# Patient Record
Sex: FEMALE | Race: WHITE | URBAN - METROPOLITAN AREA
[De-identification: names, ages, dates, MRNs, and addresses within clinical notes are randomized per-mention and may not be internally consistent; named-entity substitution may affect disease eponyms.]

---

## 2017-02-07 ENCOUNTER — TELEPHONE (OUTPATIENT)
Dept: NURSING | Facility: CLINIC | Age: 37
End: 2017-02-07

## 2017-02-08 ENCOUNTER — OFFICE VISIT (OUTPATIENT)
Dept: FAMILY MEDICINE | Facility: CLINIC | Age: 37
End: 2017-02-08
Payer: COMMERCIAL

## 2017-02-08 ENCOUNTER — TELEPHONE (OUTPATIENT)
Dept: NURSING | Facility: CLINIC | Age: 37
End: 2017-02-08

## 2017-02-08 VITALS
BODY MASS INDEX: 26.12 KG/M2 | SYSTOLIC BLOOD PRESSURE: 102 MMHG | WEIGHT: 153 LBS | RESPIRATION RATE: 14 BRPM | HEIGHT: 64 IN | OXYGEN SATURATION: 99 % | HEART RATE: 74 BPM | TEMPERATURE: 97.9 F | DIASTOLIC BLOOD PRESSURE: 66 MMHG

## 2017-02-08 PROCEDURE — 99202 OFFICE O/P NEW SF 15 MIN: CPT | Performed by: PHYSICIAN ASSISTANT

## 2017-02-08 NOTE — TELEPHONE ENCOUNTER
"Call Type: Triage Call    Presenting Problem: \"Stuck by dirty needle\" used to give hydration to  her cat. Was very discolored and now less discolored. Dull soreness  and numbness. Swelling and some bleeding today.  Triage Note:  Guideline Title: Puncture Wound  Recommended Disposition: See Provider within 4 hours  Original Inclination: Did not know what to do  Override Disposition:  Intended Action: Follow advice given  Physician Contacted: No  Any new OR worsening signs and symptoms of soft tissue infection ?  YES  Human bite OR animal bite ? NO  New or worsening signs and symptoms that may indicate shock ? NO  Bleeding not controlled with 10 minutes of direct pressure or controlled with  pressure but starts again when pressure released ? NO  Grossly dirty wound or unable to clean ? NO  New onset numbness/tingling, weakness/paralysis, or inability to purposely move at  or below the site of injury ? NO  Blood spurting from wound despite firm pressure or large amount of blood loss  (such as soaked hand towel) ? NO  Obvious new deformity (bone is visibly out of place or misshapen) ? NO  Unbearable pain since injury ? NO  Severe traumatic injury likely to cause damage to underlying structures/organs of  torso, head, neck or face or a complete/partial amputation of an extremity (other  than single finger or toe) ? NO  Any full thickness puncture wound (passes through the skin layers of epidermis and  dermis and penetrates into the underlying subcutaneous tissue) OR a puncture  wound where the bottom of wound is not visible (even when wound edges are  ) ? NO  Known or suspected foreign body embedded or impaled deep in wound ((passes through  the skin layers of epidermis and dermis and penetrates into the underlying  subcutaneous tissue) ? NO  Abrasions or lacerations ? NO  Injury of finger/fingernail, including amputation, other than puncture wound ? NO  Vicki of fish hook is embedded in skin following " unsuccessful removal attempt ? NO  Has significant risk factor such as puncture wound more than 4 hours old,  diagnosed peripheral vascular disease, diagnosed diabetes, is immunocompromised,  or has high risk of retained foreign body. ? NO  Injury of toe/toenail, including amputation, other than puncture wound ? NO  Physician Instructions:  Care Advice: Call provider if symptoms worsen or new symptoms develop.  CAUTIONS  HEALTH PROMOTION / MAINTENANCE  SYMPTOM / CONDITION MANAGEMENT  Go to the ED immediately if signs of tetanus develop: - spasms of the jaw  muscles  - stiff neck - difficulty swallowing or breathing  - stiff or  rigid abdominal muscles - generalized spasms  - fever and sweating After  exposure, signs and symptoms of tetanus can occur within 3-21 days.  Apply local moist heat (such as a warm, wet wash cloth or small towel  covered with plastic wrap) to the area for 15-20 minutes every 2-3 hours  while awake.  Thoroughly wash hands with soap and water before and after touching the  site. Another option is to use a 62% alcohol-based hand .  Tetanus immunization must be given as soon as possible after injury,  usually within 72 hours, IF: - immunization status is unknown, never  immunized, or fewer than 3 doses given, - it has been 10 years or more  since last immunization  - OR if it has been 5 years or more since last booster, AND wound is a  deep, is a puncture wound, or is a tetanus-prone wound. Keep an up-to-date  record of your immunizations so unnecessary repeat doses are not given.

## 2017-02-08 NOTE — PROGRESS NOTES
SUBJECTIVE:                                                    Afsaneh Lloyd is a 36 year old female who presents to clinic today for the following health issues:    Needle puncture wound      Duration: Last night    Description (location/character/radiation): Injury to right index finger while administering her cat  SQ fluids    Intensity:  mild    Accompanying signs and symptoms: Some tenderness, slightly red    History (similar episodes/previous evaluation): None    Precipitating or alleviating factors: None    Therapies tried and outcome: Washed with soap and water.     Pt states she had just completed injection subQ into her cat for hydration (was saline) and the needle slipped and she stabbed herself in the finger. She immediately cleaned it with soap and water then wrapped it with bandage. Bleeding was stopped with pressure. Since that time she has noted bruising, swelling and pain with decreased motion of the finger. She has noted some intermittent tingling of the finger also    Patient denies fever, chills, nausea, vomiting, diarrhea, abdominal pain, chest pain, shortness of breath, headache, dizziness, lightheadedness.    No previous hx of trauma or injury to the hand/fingers.     Problem list and histories reviewed & adjusted, as indicated.  Additional history: as documented    Patient Active Problem List   Diagnosis     Dysmenorrhea     CARDIOVASCULAR SCREENING; LDL GOAL LESS THAN 160     Past Surgical History   Procedure Laterality Date     Colonoscopy  8/2/2011     Procedure:COLONOSCOPY; Surgeon:YESENIA BUCK; Location: OR       Social History   Substance Use Topics     Smoking status: Current Some Day Smoker     Smokeless tobacco: Never Used      Comment: smokes 3 cigarettes daily     Alcohol Use: Yes      Comment: social     Family History   Problem Relation Age of Onset     DIABETES Maternal Grandfather          Current Outpatient Prescriptions   Medication Sig Dispense Refill      "amoxicillin-clavulanate (AUGMENTIN) 875-125 MG per tablet Take 1 tablet by mouth 2 times daily 20 tablet 0     Multiple Vitamin (MULTI-VITAMIN) per tablet Take 1 tablet by mouth daily.       IBUPROFEN PO Take 200 mg by mouth.       HYDROcodone-acetaminophen 5-325 MG per tablet Take 1-2 tablets by mouth every 6 hours as needed for pain. 20 tablet 0     methocarbamol (ROBAXIN) 500 MG tablet Take 1-2 tablets by mouth 3 times daily as needed. 20 tablet 0     norethindrone-ethinyl estradiol (ORTHO-NOVUM 1-35 TAB,NORTREL 1-35 TAB) 1-35 MG-MCG per tablet Take 1 tablet by mouth daily. 3 Package 4       ROS:  As above    OBJECTIVE:                                                    /66 mmHg  Pulse 74  Temp(Src) 97.9  F (36.6  C) (Tympanic)  Resp 14  Ht 5' 4\" (1.626 m)  Wt 153 lb (69.4 kg)  BMI 26.25 kg/m2  SpO2 99%  Breastfeeding? No  Body mass index is 26.25 kg/(m^2).  GENERAL APPEARANCE: healthy, alert and no distress  RESP: non labored breathing  ORTHO: Hand/Finger Exam: Inspection:Index Finger:  moderate swelling, ecchymosis  Tender: Index Finger:   middle phalanx  Non-tender: All Normal except above  Range of Motion Index Finger:   flexion MCP   full, extension MCP   full, flexion PIP   full, extension PIP  full, flexion DIP   full, extension DIP  full  Strength: full strength  Special tests: none    Diagnostic Test Results:  none      ASSESSMENT/PLAN:                                                        ICD-10-CM    1. Needlestick injury accident, initial encounter W27.3XXA amoxicillin-clavulanate (AUGMENTIN) 875-125 MG per tablet     All tendons are functional; puncture wounds do not appear to involve tendons.   We discussed in detail potential for worsening symptoms including infection and/or tenosynovitis as noted below.     Patient Instructions   Rest the finger (use something to brace it to prevent  A lot of movement of the finger)  elevate the finger about the level of your heart  Ice the finger " for 20-30 mins every few hours to reduce swelling and prevent pain  Use ibuprofen 400mg every 4-6 hours to reduce swelling for the next 2-3 days  Take antibiotic as directed    Watch for redness, swelling that does not go down, increased pain or decreased ability to move your finger the way we talked about; these things indicate a worsening problem.         Nicole Joy Siegler, PA-C  [facility]

## 2017-02-08 NOTE — TELEPHONE ENCOUNTER
Call Type: Triage Call    Presenting Problem: Patient calling to check her tetanus status from  her chart (UTD). She was giving her cat Sub Q fluids (her cat has  been sick) and when she was removing the needle from the cats  abdomen she accidentally poked herself with the dirtly needle in her  index finger. It bled freely, has stopped now. I suggested she soake  the finger in warm, soapy water, apply antibiotic ointment and watch  for signs of infection.  Triage Note:  Guideline Title: Puncture Wound  Recommended Disposition: Provide Home/Self Care  Original Inclination: Wanted to speak with a nurse  Override Disposition:  Intended Action: Follow Selfcare / Homecare  Physician Contacted: No  All other situations ?  YES  Tetanus immunization not up to date ? NO  Human bite OR animal bite ? NO  Vicki of fish hook is embedded in skin ? NO  Splinter, thorn, or cactus spine in the skin ? NO  New or worsening signs and symptoms that may indicate shock ? NO  Bleeding not controlled with 10 minutes of direct pressure or controlled with  pressure but starts again when pressure released ? NO  Grossly dirty wound or unable to clean ? NO  New onset numbness/tingling, weakness/paralysis, or inability to purposely move at  or below the site of injury ? NO  Accidental needle stick (not known to be unused) ? NO  Blood spurting from wound despite firm pressure or large amount of blood loss  (such as soaked hand towel) ? NO  Recently pierced or tattooed body part ? NO  Localized soreness/tenderness, swelling or discoloration of skin at injection site  (including immunizations) ? NO  Obvious new deformity (bone is visibly out of place or misshapen) ? NO  Unbearable pain since injury ? NO  Severe traumatic injury likely to cause damage to underlying structures/organs of  torso, head, neck or face or a complete/partial amputation of an extremity (other  than single finger or toe) ? NO  Any full thickness puncture wound (passes through  the skin layers of epidermis and  dermis and penetrates into the underlying subcutaneous tissue) OR a puncture  wound where the bottom of wound is not visible (even when wound edges are  ) ? NO  Known or suspected foreign body embedded or impaled deep in wound ((passes through  the skin layers of epidermis and dermis and penetrates into the underlying  subcutaneous tissue) ? NO  Abrasions or lacerations ? NO  Injury of finger/fingernail, including amputation, other than puncture wound ? NO  Vicki of fish hook is embedded in skin following unsuccessful removal attempt ? NO  Has significant risk factor such as puncture wound more than 4 hours old,  diagnosed peripheral vascular disease, diagnosed diabetes, is immunocompromised,  or has high risk of retained foreign body. ? NO  Other shallow puncture wound caused by a small nail, pin, needle, wire, or staple  and that is easily removed without any retained material ? NO  Localized soreness/tenderness, swelling or discoloration of skin at venipuncture  site (intravenous, blood draw, blood donation etc.) ? NO  Localized redness, drainage, swelling, warmth to touch, pain or a hard, knotty  feeling over a vein at a recent venipuncture site (intravenous, blood draw, blood  donation site etc.) ? NO  Injury of toe/toenail, including amputation, other than puncture wound ? NO  Currently taking prescribed antibiotics for 3 or more days or has completed a  prescribed course of antibiotics AND signs and symptoms of soft tissue infections  have not improved or are worsening ? NO  Any new OR worsening signs and symptoms of soft tissue infection ? NO  Physician Instructions:  Care Advice: Call provider if wound or area around wound becomes  increasingly red or painful, there is a purulent or foul smelling  discharge, red streaks develop leading away from wound, or if you develop  any temperature elevation.  CAUTIONS  SYMPTOM / CONDITION MANAGEMENT  Gently wash around the  area with a mild soap and water. Wash the wound with  plain water. Apply firm pressure with a clean cloth to control bleeding.  Apply an nonprescription topical antibiotic ointment such as bacitracin, if  no known allergies.  Cover with a bandage to keep wound clean and protect from further injury.  Thoroughly wash hands with soap and water before and after touching the  site. Another option is to use a 62% alcohol-based hand .  TD Vaccine (Adult Tetanus & Diphtheria) ? Possible Reactions Most people do  not have any problems at all. Reactions are usually mild and do not need  treatment. Serious reactions are possible but rare.  Any medication may  cause serious allergic reactions. Severe allergic reactions are very rare  but may involve hives, swelling of face and throat, difficulty breathing,  weakness, and fast or irregular pulse. Call 911 immediately if these  symptoms occur. Mild:  Pain, redness or swelling at injection site  mild fever  headache  tiredness                                                      Moderate:  Fever over 102 F (38.9 C) (rare) Severe:  Severe pain, severe swelling,  bleeding and/or redness at injection site (rare)  Tetanus immunization must be given as soon as possible after injury,  usually within 72 hours, IF: - immunization status is unknown, never  immunized, or fewer than 3 doses given, - it has been 10 years or more  since last immunization  - OR if it has been 5 years or more since last booster, AND wound is a  deep, is a puncture wound, or is a tetanus-prone wound. Keep an up-to-date  record of your immunizations so unnecessary repeat doses are not given.

## 2017-02-08 NOTE — PATIENT INSTRUCTIONS
Rest the finger (use something to brace it to prevent  A lot of movement of the finger)  elevate the finger about the level of your heart  Ice the finger for 20-30 mins every few hours to reduce swelling and prevent pain  Use ibuprofen 400mg every 4-6 hours to reduce swelling for the next 2-3 days  Take antibiotic as directed    Watch for redness, swelling that does not go down, increased pain or decreased ability to move your finger the way we talked about; these things indicate a worsening problem.

## 2017-02-08 NOTE — NURSING NOTE
"Chief Complaint   Patient presents with     Needle Stick     Last night while administering fluids to her cat that is currently ill       Initial /66 mmHg  Pulse 74  Temp(Src) 97.9  F (36.6  C) (Tympanic)  Resp 14  Ht 5' 4\" (1.626 m)  Wt 153 lb (69.4 kg)  BMI 26.25 kg/m2  SpO2 99%  Breastfeeding? No Estimated body mass index is 26.25 kg/(m^2) as calculated from the following:    Height as of this encounter: 5' 4\" (1.626 m).    Weight as of this encounter: 153 lb (69.4 kg).  Medication Reconciliation: complete     Iman Barreto LPN  "

## 2017-02-08 NOTE — MR AVS SNAPSHOT
After Visit Summary   2/8/2017    Afsaneh Lloyd    MRN: [id number]           Patient Information     Date Of Birth          1980        Visit Information        Provider Department      2/8/2017 2:10 PM Siegler, Nicole Joy, PA-C [facility]        Today's Diagnoses     Needlestick injury accident, initial encounter    -  1       Care Instructions    Rest the finger (use something to brace it to prevent  A lot of movement of the finger)  elevate the finger about the level of your heart  Ice the finger for 20-30 mins every few hours to reduce swelling and prevent pain  Use ibuprofen 400mg every 4-6 hours to reduce swelling for the next 2-3 days  Take antibiotic as directed    Watch for redness, swelling that does not go down, increased pain or decreased ability to move your finger the way we talked about; these things indicate a worsening problem.         Follow-ups after your visit        Who to contact     If you have questions or need follow up information about today's clinic visit or your schedule please contact [facility] directly at [phone].  Normal or non-critical lab and imaging results will be communicated to you by Dinetouchhart, letter or phone within 4 business days after the clinic has received the results. If you do not hear from us within 7 days, please contact the clinic through [employer] or phone. If you have a critical or abnormal lab result, we will notify you by phone as soon as possible.  Submit refill requests through [employer] or call your pharmacy and they will forward the refill request to us. Please allow 3 business days for your refill to be completed.          Additional Information About Your Visit        MyChart Information     [employer] gives you secure access to your electronic health record. If you see a primary care provider, you can also send messages to your care team and make appointments. If you have  "questions, please call your primary care clinic.  If you do not have a primary care provider, please call [phone] and they will assist you.        Care EveryWhere ID     This is your Care EveryWhere ID. This could be used by other organizations to access your [facility] medical records  [id number]        Your Vitals Were     Pulse Temperature Respirations    74 97.9  F (36.6  C) (Tympanic) 14    Height BMI (Body Mass Index) Pulse Oximetry    5' 4\" (1.626 m) 26.25 kg/m2 99%    Breastfeeding?          No         Blood Pressure from Last 3 Encounters:   02/08/17 102/66   04/25/12 103/78   08/02/11 107/81    Weight from Last 3 Encounters:   02/08/17 153 lb (69.4 kg)   04/25/12 140 lb (63.504 kg)   07/08/11 148 lb 9.6 oz (67.405 kg)              Today, you had the following     No orders found for display         Today's Medication Changes          These changes are accurate as of: 2/8/17  3:32 PM.  If you have any questions, ask your nurse or doctor.               Start taking these medicines.        Dose/Directions    amoxicillin-clavulanate 875-125 MG per tablet   Commonly known as:  AUGMENTIN   Used for:  Needlestick injury accident, initial encounter   Started by:  Siegler, Nicole Joy, PA-C        Dose:  1 tablet   Take 1 tablet by mouth 2 times daily   Quantity:  20 tablet   Refills:  0            Where to get your medicines      These medications were sent to [facility] [address] [place] [id number]     Phone:  [phone]    - amoxicillin-clavulanate 875-125 MG per tablet             Primary Care Provider    Physician No Ref-Primary       No address on file        Thank you!     Thank you for choosing [facility]  for your care. Our goal is always to provide you with excellent care. Hearing back from our patients is one way we can continue to improve our services. Please take a few " minutes to complete the written survey that you may receive in the mail after your visit with us. Thank you!             Your Updated Medication List - Protect others around you: Learn how to safely use, store and throw away your medicines at www.disposemymeds.org.          This list is accurate as of: 2/8/17  3:32 PM.  Always use your most recent med list.                   Brand Name Dispense Instructions for use    amoxicillin-clavulanate 875-125 MG per tablet    AUGMENTIN    20 tablet    Take 1 tablet by mouth 2 times daily       HYDROcodone-acetaminophen 5-325 MG per tablet    NORCO    20 tablet    Take 1-2 tablets by mouth every 6 hours as needed for pain.       IBUPROFEN PO      Take 200 mg by mouth.       methocarbamol 500 MG tablet    ROBAXIN    20 tablet    Take 1-2 tablets by mouth 3 times daily as needed.       Multi-vitamin Tabs tablet   Generic drug:  multivitamin, therapeutic with minerals      Take 1 tablet by mouth daily.       norethindrone-ethinyl estradiol 1-35 MG-MCG per tablet    ORTHO-NOVUM 1-35 TAB,NORTREL 1-35 TAB    3 Package    Take 1 tablet by mouth daily.

## 2017-05-12 ENCOUNTER — TELEPHONE (OUTPATIENT)
Dept: FAMILY MEDICINE | Facility: CLINIC | Age: 37
End: 2017-05-12

## 2017-05-12 NOTE — TELEPHONE ENCOUNTER
Panel Management Review      Patient has the following on her problem list: None      Composite cancer screening  Chart review shows that this patient is due/due soon for the following Pap Smear  Summary:    Patient is due/failing the following:   PAP and PHYSICAL    Action needed:   Patient needs office visit for Physical.    Type of outreach:    Sent letter.    Questions for provider review:    None                                                                                                                                    Iman Barreto LPN     Chart routed to Care Team .

## 2017-05-12 NOTE — LETTER
[facility]  [address]  [facility] [address]  [phone]                                                                                                           Afsaneh Llyod  [address] [place] [id number]    May 12, 2017          Dear Afsaneh Lloyd,    We care about your well-being!  In order to ensure we are providing the best quality care, we have reviewed your chart and see that you are due for:     __x___ Physical   __x___ Pap Smear   _____ Mammogram   _____ Diabetes Followup   _____ Hypertension Followup   _____ Cholesterol Followup (Provider Appointment)   _____ Cholesterol Test (Lab-Only Appointment)   _____ Other:       We can assist you in scheduling these appointments at [phone].    If you have had (or plan to have) any of these tests done at a facility other than [facility] or a [facility], please have the results from these tests sent to your primary physician at [facility].           Sincerely,    Nicole Siegler, PA

## 2017-06-03 ENCOUNTER — HEALTH MAINTENANCE LETTER (OUTPATIENT)
Age: 37
End: 2017-06-03

## 2017-12-01 ENCOUNTER — TRANSFERRED RECORDS (OUTPATIENT)
Dept: HEALTH INFORMATION MANAGEMENT | Facility: CLINIC | Age: 37
End: 2017-12-01

## 2017-12-01 LAB — PAP SMEAR - HIM PATIENT REPORTED: NEGATIVE

## 2018-05-09 ENCOUNTER — OFFICE VISIT (OUTPATIENT)
Dept: FAMILY MEDICINE | Facility: CLINIC | Age: 38
End: 2018-05-09
Payer: COMMERCIAL

## 2018-05-09 VITALS
OXYGEN SATURATION: 97 % | HEIGHT: 64 IN | SYSTOLIC BLOOD PRESSURE: 95 MMHG | WEIGHT: 146 LBS | HEART RATE: 71 BPM | TEMPERATURE: 98.5 F | RESPIRATION RATE: 16 BRPM | BODY MASS INDEX: 24.92 KG/M2 | DIASTOLIC BLOOD PRESSURE: 64 MMHG

## 2018-05-09 DIAGNOSIS — F43.22 ADJUSTMENT DISORDER WITH ANXIOUS MOOD: ICD-10-CM

## 2018-05-09 DIAGNOSIS — R10.11 RUQ ABDOMINAL PAIN: ICD-10-CM

## 2018-05-09 DIAGNOSIS — Z00.00 ENCOUNTER FOR ROUTINE ADULT HEALTH EXAMINATION WITHOUT ABNORMAL FINDINGS: Primary | ICD-10-CM

## 2018-05-09 LAB
ALBUMIN SERPL-MCNC: 4.1 G/DL (ref 3.4–5)
ALP SERPL-CCNC: 57 U/L (ref 40–150)
ALT SERPL W P-5'-P-CCNC: 19 U/L (ref 0–50)
AST SERPL W P-5'-P-CCNC: 11 U/L (ref 0–45)
BILIRUB DIRECT SERPL-MCNC: 0.3 MG/DL (ref 0–0.2)
BILIRUB SERPL-MCNC: 1.2 MG/DL (ref 0.2–1.3)
CHOLEST SERPL-MCNC: 161 MG/DL
GLUCOSE SERPL-MCNC: 80 MG/DL (ref 70–99)
HDLC SERPL-MCNC: 76 MG/DL
LDLC SERPL CALC-MCNC: 69 MG/DL
NONHDLC SERPL-MCNC: 85 MG/DL
PROT SERPL-MCNC: 7 G/DL (ref 6.8–8.8)
TRIGL SERPL-MCNC: 80 MG/DL

## 2018-05-09 PROCEDURE — 99213 OFFICE O/P EST LOW 20 MIN: CPT | Mod: 25 | Performed by: FAMILY MEDICINE

## 2018-05-09 PROCEDURE — 36415 COLL VENOUS BLD VENIPUNCTURE: CPT | Performed by: FAMILY MEDICINE

## 2018-05-09 PROCEDURE — 99395 PREV VISIT EST AGE 18-39: CPT | Performed by: FAMILY MEDICINE

## 2018-05-09 PROCEDURE — 82947 ASSAY GLUCOSE BLOOD QUANT: CPT | Performed by: FAMILY MEDICINE

## 2018-05-09 PROCEDURE — 80076 HEPATIC FUNCTION PANEL: CPT | Performed by: FAMILY MEDICINE

## 2018-05-09 PROCEDURE — 80061 LIPID PANEL: CPT | Performed by: FAMILY MEDICINE

## 2018-05-09 ASSESSMENT — ANXIETY QUESTIONNAIRES
6. BECOMING EASILY ANNOYED OR IRRITABLE: MORE THAN HALF THE DAYS
5. BEING SO RESTLESS THAT IT IS HARD TO SIT STILL: NOT AT ALL
3. WORRYING TOO MUCH ABOUT DIFFERENT THINGS: MORE THAN HALF THE DAYS
GAD7 TOTAL SCORE: 8
7. FEELING AFRAID AS IF SOMETHING AWFUL MIGHT HAPPEN: SEVERAL DAYS
IF YOU CHECKED OFF ANY PROBLEMS ON THIS QUESTIONNAIRE, HOW DIFFICULT HAVE THESE PROBLEMS MADE IT FOR YOU TO DO YOUR WORK, TAKE CARE OF THINGS AT HOME, OR GET ALONG WITH OTHER PEOPLE: VERY DIFFICULT
1. FEELING NERVOUS, ANXIOUS, OR ON EDGE: SEVERAL DAYS
2. NOT BEING ABLE TO STOP OR CONTROL WORRYING: SEVERAL DAYS

## 2018-05-09 ASSESSMENT — PATIENT HEALTH QUESTIONNAIRE - PHQ9: 5. POOR APPETITE OR OVEREATING: SEVERAL DAYS

## 2018-05-09 NOTE — MR AVS SNAPSHOT
After Visit Summary   5/9/2018    Afsaneh Lloyd    MRN: [id number]           Patient Information     Date Of Birth          1980        Visit Information        Provider Department      5/9/2018 9:00 AM Heather Johns MD [facility]        Today's Diagnoses     Encounter for routine adult health examination without abnormal findings    -  1    RUQ abdominal pain        Adjustment disorder with anxious mood          Care Instructions      Preventive Health Recommendations  Female Ages 26 - 39  Yearly exam:   See your health care provider every year in order to    Review health changes.     Discuss preventive care.      Review your medicines if you your doctor has prescribed any.    Until age 30: Get a Pap test every three years (more often if you have had an abnormal result).    After age 30: Talk to your doctor about whether you should have a Pap test every 3 years or have a Pap test with HPV screening every 5 years.   You do not need a Pap test if your uterus was removed (hysterectomy) and you have not had cancer.  You should be tested each year for STDs (sexually transmitted diseases), if you're at risk.   Talk to your provider about how often to have your cholesterol checked.  If you are at risk for diabetes, you should have a diabetes test (fasting glucose).  Shots: Get a flu shot each year. Get a tetanus shot every 10 years.   Nutrition:     Eat at least 5 servings of fruits and vegetables each day.    Eat whole-grain bread, whole-wheat pasta and brown rice instead of white grains and rice.    Talk to your provider about Calcium and Vitamin D.     Lifestyle    Exercise at least 150 minutes a week (30 minutes a day, 5 days of the week). This will help you control your weight and prevent disease.    Limit alcohol to one drink per day.    No smoking.     Wear sunscreen to prevent skin cancer.    See your dentist every six months for an exam and cleaning.            Follow-ups  "after your visit        Who to contact     If you have questions or need follow up information about today's clinic visit or your schedule please contact [facility] directly at [phone].  Normal or non-critical lab and imaging results will be communicated to you by MyChart, letter or phone within 4 business days after the clinic has received the results. If you do not hear from us within 7 days, please contact the clinic through MyChart or phone. If you have a critical or abnormal lab result, we will notify you by phone as soon as possible.  Submit refill requests through docTrackr or call your pharmacy and they will forward the refill request to us. Please allow 3 business days for your refill to be completed.          Additional Information About Your Visit        Clean PEThart Information     docTrackr gives you secure access to your electronic health record. If you see a primary care provider, you can also send messages to your care team and make appointments. If you have questions, please call your primary care clinic.  If you do not have a primary care provider, please call [phone] and they will assist you.        Care EveryWhere ID     This is your Care EveryWhere ID. This could be used by other organizations to access your [facility] medical records  [id number]        Your Vitals Were     Pulse Temperature Respirations Height Last Period Pulse Oximetry    71 98.5  F (36.9  C) (Oral) 16 5' 3.75\" (1.619 m) 04/27/2018 97%    BMI (Body Mass Index)                   25.26 kg/m2            Blood Pressure from Last 3 Encounters:   05/09/18 95/64   02/08/17 102/66   04/25/12 103/78    Weight from Last 3 Encounters:   05/09/18 146 lb (66.2 kg)   02/08/17 153 lb (69.4 kg)   04/25/12 140 lb (63.5 kg)              We Performed the Following     Glucose     Hepatic panel (Albumin, ALT, AST, Bili, Alk Phos, TP)     Lipid Profile (Chol, Trig, HDL, LDL calc)     OFFICE/OUTPT VISIT,EST,LEVL III        Primary Care " Provider Office Phone # Fax #    Heather Johns [phone] [address] [facility] [place] [id number]        Equal Access to Services     FELIPE SANDOVAL : Hadii rachel ku hadlizzetteo Soomaali, waaxda luqadaha, qaybta kaalmada adeegyada, vidhya tobarn andres wood laAileenynes ambrosio. So [facility] [phone].    ATENCIÓN: Si habla español, tiene a balbuena disposición servicios gratuitos de asistencia lingüística. Llame al [phone].    We comply with applicable federal civil rights laws and [place] laws. We do not discriminate on the basis of race, color, national origin, age, disability, sex, sexual orientation, or gender identity.            Thank you!     Thank you for choosing [facility]  for your care. Our goal is always to provide you with excellent care. Hearing back from our patients is one way we can continue to improve our services. Please take a few minutes to complete the written survey that you may receive in the mail after your visit with us. Thank you!             Your Updated Medication List - Protect others around you: Learn how to safely use, store and throw away your medicines at www.disposemymeds.org.      Notice  As of 5/9/2018 10:19 AM    You have not been prescribed any medications.

## 2018-05-09 NOTE — PROGRESS NOTES
SUBJECTIVE:   CC: Afsaneh Lloyd is an 37 year old woman who presents for preventive health visit.     Physical   Annual:     Getting at least 3 servings of Calcium per day::  Yes    Bi-annual eye exam::  NO    Dental care twice a year::  Yes    Sleep apnea or symptoms of sleep apnea::  Daytime drowsiness    Diet::  Gluten-free/reduced    Frequency of exercise::  2-3 days/week    Duration of exercise::  30-45 minutes    Taking medications regularly::  Not Applicable    Additional concerns today::  No              PROBLEMS TO ADD ON...  1)Broke up with boyfriend and they were together for three yrs , broke up two weeks ago , feels angry and upset a lot , tearful,  2) has been having some RUQ pain , was bad a week ago but now only a twinge , she has been drinking couple of glasses of wine nightly for th epast two weeks , states that this is with friends and not alone at home but trying to be social   3) PAP done last yr at [facility] normal   Not on BC now , got her IUD out in February of this yr , LMP was April 27th, 2018 normal monthly periods     Today's PHQ-2 Score:   PHQ-2 ( 1999 Pf[employer]) 5/9/2018   Q1: Little interest or pleasure in doing things 0   Q2: Feeling down, depressed or hopeless 1   PHQ-2 Score 1   Q1: Little interest or pleasure in doing things Not at all   Q2: Feeling down, depressed or hopeless Several days   PHQ-2 Score 1       Abuse: Current or Past(Physical, Sexual or Emotional)- No  Do you feel safe in your environment - Yes    Social History   Substance Use Topics     Smoking status: Current Some Day Smoker     Smokeless tobacco: Never Used      Comment: smokes 3 cigarettes daily     Alcohol use Yes      Comment: social     Alcohol Use 5/9/2018   If you drink alcohol do you typically have greater than 3 drinks per day OR greater than 7 drinks per week? No       Reviewed orders with patient.  Reviewed health maintenance and updated orders accordingly - Yes  Labs reviewed in EPIC  BP Readings  from Last 3 Encounters:   05/09/18 95/64   02/08/17 102/66   04/25/12 103/78    Wt Readings from Last 3 Encounters:   05/09/18 146 lb (66.2 kg)   02/08/17 153 lb (69.4 kg)   04/25/12 140 lb (63.5 kg)                  Patient Active Problem List   Diagnosis     Dysmenorrhea     CARDIOVASCULAR SCREENING; LDL GOAL LESS THAN 160     Past Surgical History:   Procedure Laterality Date     COLONOSCOPY  8/2/2011    Procedure:COLONOSCOPY; Surgeon:YESENIA BUCK; Location: OR       Social History   Substance Use Topics     Smoking status: Current Some Day Smoker     Smokeless tobacco: Never Used      Comment: smokes 3 cigarettes daily     Alcohol use Yes      Comment: social     Family History   Problem Relation Age of Onset     DIABETES Maternal Grandfather          No current outpatient prescriptions on file.     Allergies   Allergen Reactions     No Known Drug Allergies      Recent Labs   Lab Test  04/25/12   1915   ALT  <6   CR  0.90   GFRESTIMATED  74   GFRESTBLACK  89   POTASSIUM  4.0        Mammogram not appropriate for this patient based on age.    Pertinent mammograms are reviewed under the imaging tab.  History of abnormal Pap smear: NO - age 30- 65 PAP every 3 years recommended    Reviewed and updated as needed this visit by clinical staff  Tobacco  Meds         Reviewed and updated as needed this visit by Provider        Past Medical History:   Diagnosis Date     HPV (human papillomavirus)     in the past      Past Surgical History:   Procedure Laterality Date     COLONOSCOPY  8/2/2011    Procedure:COLONOSCOPY; Surgeon:YESENIA BUCK; Location: OR       Review of Systems  CONSTITUTIONAL: NEGATIVE for fever, chills, change in weight  INTEGUMENTARU/SKIN: NEGATIVE for worrisome rashes, moles or lesions  EYES: NEGATIVE for vision changes or irritation  ENT: NEGATIVE for ear, mouth and throat problems  RESP: NEGATIVE for significant cough or SOB  BREAST: NEGATIVE for masses, tenderness or discharge  CV:  NEGATIVE for chest pain, palpitations or peripheral edema  GI: NEGATIVE for nausea, abdominal pain, heartburn, or change in bowel habits  : NEGATIVE for unusual urinary or vaginal symptoms. Periods are regular.  MUSCULOSKELETAL: NEGATIVE for significant arthralgias or myalgia  NEURO: NEGATIVE for weakness, dizziness or paresthesias  PSYCHIATRIC: NEGATIVE for changes in mood or affect     OBJECTIVE:   There were no vitals taken for this visit.  Physical Exam  GENERAL: healthy, alert and no distress  EYES: Eyes grossly normal to inspection, PERRL and conjunctivae and sclerae normal  HENT: ear canals and TM's normal, nose and mouth without ulcers or lesions  NECK: no adenopathy, no asymmetry, masses, or scars and thyroid normal to palpation  RESP: lungs clear to auscultation - no rales, rhonchi or wheezes  BREAST: normal without masses, tenderness or nipple discharge and no palpable axillary masses or adenopathy  CV: regular rate and rhythm, normal S1 S2, no S3 or S4, no murmur, click or rub, no peripheral edema and peripheral pulses strong  ABDOMEN: soft, nontender, no hepatosplenomegaly, no masses and bowel sounds normal  MS: no gross musculoskeletal defects noted, no edema  SKIN: no suspicious lesions or rashes  NEURO: Normal strength and tone, mentation intact and speech normal  PSYCH: mentation appears normal, affect normal/bright    ASSESSMENT/PLAN:   1. Encounter for routine adult health examination without abnormal findings  Discussed diet,calcium,exercise.Went over self breast exam.Thin prep was NOT  done.Eyes and teeth UTD.No immunizations needed today.See orders below for tests ordered and screening needed.    - Lipid Profile (Chol, Trig, HDL, LDL calc)  - Glucose    2. RUQ abdominal pain  We discussed since she has been drinking more than a glass of wine daily for over two weeks , we need to check her LFTS , Pt is aware  and comfortable with the current plan.    - Hepatic panel (Albumin, ALT, AST, Bili,  "Alk Phos, TP)    3. Adjustment disorder with anxious mood - we discussed different relaxation techniques , exercise , yoga , meditaion, deep breathing . She does have a therapist but states she does not go very often as insurance does not cover until deductible is met and she has to pay out of pocket about a 100 dollars each time . Discussed starting an antidepressant but she declines at this time , would let me know if things get worse , or new symtpoms , she would need to f/u in three to four weeks . Pt is aware  and comfortable with the current plan.    COUNSELING:  Reviewed preventive health counseling, as reflected in patient instructions       Regular exercise       Healthy diet/nutrition       Vision screening       Contraception         reports that she has been smoking.  She has never used smokeless tobacco.    Estimated body mass index is 26.26 kg/(m^2) as calculated from the following:    Height as of 2/8/17: 5' 4\" (1.626 m).    Weight as of 2/8/17: 153 lb (69.4 kg).       Counseling Resources:  ATP IV Guidelines  Pooled Cohorts Equation Calculator  Breast Cancer Risk Calculator  FRAX Risk Assessment  ICSI Preventive Guidelines  Dietary Guidelines for Americans, 2010  mDialog's MyPlate  ASA Prophylaxis  Lung CA Screening    Heather Johns MD  [facility] CLINICAnswers for HPI/ROS submitted by the patient on 5/9/2018   PHQ-2 Score: 1    "

## 2018-05-09 NOTE — NURSING NOTE
"Chief Complaint   Patient presents with     Physical     initial BP 95/64 (BP Location: Left arm, Cuff Size: Adult Regular)  Pulse 71  Temp 98.5  F (36.9  C) (Oral)  Resp 16  Ht 5' 3.75\" (1.619 m)  Wt 146 lb (66.2 kg)  LMP 04/27/2018  SpO2 97%  BMI 25.26 kg/m2 Estimated body mass index is 25.26 kg/(m^2) as calculated from the following:    Height as of this encounter: 5' 3.75\" (1.619 m).    Weight as of this encounter: 146 lb (66.2 kg).  BP completed using cuff size: regular.  L  arm      Health Maintenance that is potentially due pending provider review:  NONE    n/a    Jesse Guzman ma  "

## 2018-05-10 ASSESSMENT — PATIENT HEALTH QUESTIONNAIRE - PHQ9: SUM OF ALL RESPONSES TO PHQ QUESTIONS 1-9: 4

## 2018-05-10 ASSESSMENT — ANXIETY QUESTIONNAIRES: GAD7 TOTAL SCORE: 8

## 2019-12-09 ENCOUNTER — HEALTH MAINTENANCE LETTER (OUTPATIENT)
Age: 39
End: 2019-12-09

## 2021-01-14 ENCOUNTER — HEALTH MAINTENANCE LETTER (OUTPATIENT)
Age: 41
End: 2021-01-14

## 2021-03-14 ENCOUNTER — HEALTH MAINTENANCE LETTER (OUTPATIENT)
Age: 41
End: 2021-03-14

## 2021-10-23 ENCOUNTER — HEALTH MAINTENANCE LETTER (OUTPATIENT)
Age: 41
End: 2021-10-23

## 2022-10-10 ENCOUNTER — HEALTH MAINTENANCE LETTER (OUTPATIENT)
Age: 42
End: 2022-10-10

## 2022-11-27 ENCOUNTER — HEALTH MAINTENANCE LETTER (OUTPATIENT)
Age: 42
End: 2022-11-27

## 2023-09-11 ENCOUNTER — PATIENT OUTREACH (OUTPATIENT)
Dept: CARE COORDINATION | Facility: CLINIC | Age: 43
End: 2023-09-11
Payer: COMMERCIAL

## 2023-09-25 ENCOUNTER — PATIENT OUTREACH (OUTPATIENT)
Dept: CARE COORDINATION | Facility: CLINIC | Age: 43
End: 2023-09-25
Payer: COMMERCIAL

## 2024-01-07 ENCOUNTER — HEALTH MAINTENANCE LETTER (OUTPATIENT)
Age: 44
End: 2024-01-07

## 2024-11-27 ENCOUNTER — HOSPITAL ENCOUNTER (EMERGENCY)
Facility: CLINIC | Age: 44
Discharge: HOME | End: 2024-11-27
Attending: PHYSICIAN ASSISTANT
Payer: COMMERCIAL

## 2024-11-27 VITALS
OXYGEN SATURATION: 100 % | HEART RATE: 89 BPM | BODY MASS INDEX: 26.76 KG/M2 | DIASTOLIC BLOOD PRESSURE: 90 MMHG | TEMPERATURE: 98.1 F | WEIGHT: 151.01 LBS | SYSTOLIC BLOOD PRESSURE: 127 MMHG | RESPIRATION RATE: 18 BRPM | HEIGHT: 63 IN

## 2024-11-27 DIAGNOSIS — S61.215A LACERATION OF LEFT RING FINGER WITHOUT FOREIGN BODY WITHOUT DAMAGE TO NAIL, INITIAL ENCOUNTER: ICD-10-CM

## 2024-11-27 PROCEDURE — 12001 RPR S/N/AX/GEN/TRNK 2.5CM/<: CPT | Mod: F3

## 2024-11-27 PROCEDURE — 99282 EMERGENCY DEPT VISIT SF MDM: CPT

## 2024-11-27 ASSESSMENT — COLUMBIA-SUICIDE SEVERITY RATING SCALE - C-SSRS
1. IN THE PAST MONTH, HAVE YOU WISHED YOU WERE DEAD OR WISHED YOU COULD GO TO SLEEP AND NOT WAKE UP?: NO
2. HAVE YOU ACTUALLY HAD ANY THOUGHTS OF KILLING YOURSELF IN THE PAST MONTH?: NO
6. HAVE YOU EVER DONE ANYTHING, STARTED TO DO ANYTHING, OR PREPARED TO DO ANYTHING TO END YOUR LIFE?: NO

## 2024-11-27 ASSESSMENT — ACTIVITIES OF DAILY LIVING (ADL): ADLS_ACUITY_SCORE: 41

## 2024-11-28 NOTE — ED PROVIDER NOTES
"  Emergency Department Note      History of Present Illness     Chief Complaint   Laceration      HPI   Afsaneh Lloyd is a 44 year old female who presents to the ED for evaluation of a laceration. Patient states that about 3 hours prior to presentation she was cutting squash when she accidentally sustained a laceration to her left 4th digit. She is right handed. No numbness/tingling.    Independent Historian   None    Review of External Notes   [facility] tetanus 1/7/22    Past Medical History     Medical History and Problem List   Past Medical History:   Diagnosis Date    HPV (human papillomavirus)        Medications   No current outpatient medications on file.      Surgical History   Past Surgical History:   Procedure Laterality Date    COLONOSCOPY  8/2/2011    Procedure:COLONOSCOPY; Surgeon:YESENIA BUCK; Location:[facility] OR       Physical Exam     Patient Vitals for the past 24 hrs:   BP Temp Temp src Pulse Resp SpO2 Height Weight   11/27/24 2204 (!) 127/90 98.1  F (36.7  C) Oral 89 18 100 % 1.6 m (5' 3\") 68.5 kg (151 lb 0.2 oz)     Physical Exam  HEENT: mmm  Eyes: PERRL B/L  Neck: Supple  CV: Peripheral pulses intact and regular  Resp: Speaking in full sentences without any respiratory distress  Ext:  Left Hand  1.75cm laceration noted to pad of 4th digit.  No bony TTP.  Full ROM of digits.  Sensation and perfusion intact throughout hand  Remainder of the skeletal survey is unremarkable  Skin: warm dry well perfused. See above.  Neuro: Alert  Nursing notes and vital signs reviewed.      Diagnostics     Lab Results   Labs Ordered and Resulted from Time of ED Arrival to Time of ED Departure - No data to display    Imaging   No orders to display     Independent Interpretation   None    ED Course      Medications Administered   Medications   lidocaine 1 % 5 mL (has no administration in time range)       Procedures   [facility]    -Laceration Repair    Date/Time: 11/27/2024 10:59 PM    Performed " by: Henrik Ramos PA-C  Authorized by: Henrik Ramos PA-C    Risks, benefits and alternatives discussed.      ANESTHESIA (see MAR for exact dosages):     Anesthesia method:  Nerve block    Block needle gauge:  27 G    Block anesthetic:  Lidocaine 1% w/o epi  LACERATION DETAILS     Location:  Finger    Finger location:  L ring finger    Length (cm):  1.8    REPAIR TYPE:     Repair type:  Simple    EXPLORATION:     Wound exploration: wound explored through full range of motion      TREATMENT:     Area cleansed with:  Saline and Shur-Clens    Amount of cleaning:  Standard    SKIN REPAIR     Repair method:  Sutures    Suture size:  4-0    Suture material:  Nylon    Suture technique:  Simple interrupted    Number of sutures:  6    APPROXIMATION     Approximation:  Close    PROCEDURE    Patient Tolerance:  Patient tolerated the procedure well with no immediate complications       Discussion of Management   None    ED Course        Additional Documentation  None    Medical Decision Making / Diagnosis     CMS Diagnoses: None    MIPS       None    [facility]   Afsaneh Lloyd is a 44 year old female who presents for evaluation of a laceration to the left 4th digit. See HPI as above for additional details. Vitals and physical exam as above.  The wound was carefully evaluated and explored.  The laceration was closed as noted above.  There is no evidence of muscular, tendon, or bony damage with this laceration.  No signs of foreign body.  Possible complications (infection, scarring) were reviewed with the patient.  Follow up with primary care as noted in the discharge section. Discussed reasons to return. All questions answered. Patient discharged to home in stable condition.    Disposition   The patient was discharged.     Diagnosis     ICD-10-CM    1. Laceration of left ring finger without foreign body without damage to nail, initial encounter  S61.215A            Discharge Medications   New Prescriptions    No medications on  file       This record was created at least in part using electronic voice recognition software, so please excuse any typographical errors.         Henrik Ramos PA-C  11/27/24 4334

## 2024-11-28 NOTE — DISCHARGE INSTRUCTIONS
Clean wound daily with water and soap.  Evaluate wound daily for pus drainage, marked swelling, streaking redness going up the arm.  Should these develop seek out further evaluation.  If wound is well-appearing, keep covered with bandage.  Sutures need to be removed in 10-14 days at primary doctor.  Use Tylenol and ibuprofen for pain.

## 2024-11-28 NOTE — ED TRIAGE NOTES
Patient cut her left ring finger while trying to cut a butternut squash with a kitchen knife.  Last Tdap 01/07/2022     Triage Assessment (Adult)       Row Name 11/27/24 2202          Triage Assessment    Airway WDL WDL        Respiratory WDL    Respiratory WDL WDL        Peripheral/Neurovascular WDL    Peripheral Neurovascular WDL WDL

## 2024-11-28 NOTE — ED NOTES
Pt discharged home by writer. Pt looks well. Finger in bandage and looks well. Pt given AVS and pt and friend left amb with good gait to exit. Extra supplies and bandages provided to pt.

## 2024-12-21 ENCOUNTER — HEALTH MAINTENANCE LETTER (OUTPATIENT)
Age: 44
End: 2024-12-21

## 2025-01-25 ENCOUNTER — HEALTH MAINTENANCE LETTER (OUTPATIENT)
Age: 45
End: 2025-01-25

## 2025-02-06 ENCOUNTER — APPOINTMENT (OUTPATIENT)
Dept: GENERAL RADIOLOGY | Facility: CLINIC | Age: 45
End: 2025-02-06
Attending: EMERGENCY MEDICINE
Payer: COMMERCIAL

## 2025-02-06 ENCOUNTER — HOSPITAL ENCOUNTER (EMERGENCY)
Facility: CLINIC | Age: 45
Discharge: HOME | End: 2025-02-06
Attending: EMERGENCY MEDICINE | Admitting: EMERGENCY MEDICINE
Payer: COMMERCIAL

## 2025-02-06 DIAGNOSIS — S63.259A DISLOCATION OF FINGER, INITIAL ENCOUNTER: ICD-10-CM

## 2025-02-06 PROCEDURE — 73140 X-RAY EXAM OF FINGER(S): CPT | Mod: LT

## 2025-02-06 PROCEDURE — 250N000013 HC RX MED GY IP 250 OP 250 PS 637: Performed by: EMERGENCY MEDICINE

## 2025-02-06 PROCEDURE — 99285 EMERGENCY DEPT VISIT HI MDM: CPT | Mod: 25

## 2025-02-06 PROCEDURE — 26770 TREAT FINGER DISLOCATION: CPT | Mod: F4

## 2025-02-06 PROCEDURE — 999N000065 XR FINGER LEFT G/E 2 VIEWS

## 2025-02-06 RX ORDER — IBUPROFEN 600 MG/1
600 TABLET, FILM COATED ORAL ONCE
Status: COMPLETED | OUTPATIENT
Start: 2025-02-06 | End: 2025-02-06

## 2025-02-06 RX ADMIN — IBUPROFEN 600 MG: 600 TABLET ORAL at 10:12

## 2025-02-06 ASSESSMENT — ACTIVITIES OF DAILY LIVING (ADL): ADLS_ACUITY_SCORE: 41

## 2025-02-06 ASSESSMENT — COLUMBIA-SUICIDE SEVERITY RATING SCALE - C-SSRS
6. HAVE YOU EVER DONE ANYTHING, STARTED TO DO ANYTHING, OR PREPARED TO DO ANYTHING TO END YOUR LIFE?: NO
1. IN THE PAST MONTH, HAVE YOU WISHED YOU WERE DEAD OR WISHED YOU COULD GO TO SLEEP AND NOT WAKE UP?: NO
2. HAVE YOU ACTUALLY HAD ANY THOUGHTS OF KILLING YOURSELF IN THE PAST MONTH?: NO

## 2025-02-06 NOTE — ED PROVIDER NOTES
Emergency Department Note      History of Present Illness     Chief Complaint   Hand Injury      HPI   Afsaneh Lloyd is a 44 year old female presenting to the ED for the evaluation of a hand injury. The patient states that she fell on her left hand while on an escalator at [facility] leading her to injure her left pinky finger. She denies hitting her head or losing consciousness. She also denies any pain or injury to other areas. Afsaneh does not take any regular medications or have any medication allergies. 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    Independent Historian   None    Review of External Notes   None    Past Medical History     Medical History and Problem List   Adjustment disorder  Dysmenorrhea  HPV    Medications   No current outpatient medications on file.    Surgical History   Colonoscopy    Physical Exam     Physical Exam  Constitutional: Vital signs reviewed.  Pleasant.  HEENT: Moist mucous membranes  Cardiovascular: Regular rate and rhythm  Pulmonary/Chest: Breathing comfortably on room air.  No audible wheezing  Musculoskeletal/Extremities: No bony deformities.  Moves all 4 extremities without difficulty. Obvious dislocation at the PIP joint of the left little finger. Normal cap refill and sensation.  Neurological: Alert.  No focal deficits.  Endo: No pitting edema  Skin: No visible rash.  Psychiatric: Pleasant.  Anxious, tearful    Diagnostics     Lab Results   Labs Ordered and Resulted from Time of ED Arrival to Time of ED Departure - No data to display    Imaging   Fingers XR, 2-3 views, left   Final Result   IMPRESSION: Normal joint spaces and alignment. No fracture.         Fingers XR, 2-3 views, left   Final Result   IMPRESSION: Left small finger PIP joint malalignment with ulnar displacement of the small finger middle phalangeal base relative to the proximal phalangeal head. No acute displaced left hand fracture with particular focus on the  small finger. No    significant joint space narrowing. Small finger soft tissue swelling particularly at the PIP joint.      NOTE: ABNORMAL REPORT      THE DICTATION ABOVE DESCRIBES AN ABNORMALITY FOR WHICH FOLLOW-UP IS NEEDED.            Independent Interpretation   X-ray left little finger shows dislocation of the PIP joint with ulnar displacement.  No acute displaced fracture.  Postreduction x-ray of the left little finger shows complete reduction of the previous PIP dislocation of the left little finger.  Again no fracture noted.  ED Course      Medications Administered   Medications   ibuprofen (ADVIL/MOTRIN) tablet 600 mg (600 mg Oral $Given 2/6/25 1012)       Procedures   Procedures     Dislocation Reduction   Procedure: Dislocation Reduction  Consent: Verbal from Patient  Risks Discussed: Pain, need for repeat attempts, fracture, neurovascular injury, unsuccessful attempts, and need to go to OR  Universal Protocol: Universal protocol was followed and time out conducted just prior to starting procedure, confirming patient identity, site/side, procedure, patient position, and availability of correct equipment and implants    Indication: Dislocated L fifth (pinky) finger   Location: Left L fifth (pinky) finger  Anesthesia/Sedation: None  Procedure Detail: I manipulated the joint including traction.  The dislocation was easily reduced.   Immobilized with Other AlumaFoam splint   Post procedure assessment:  Gross deformity resolved , Neurovascular intact , and ROM improved   Patient Status: The patient tolerated the procedure well: Yes. There were no complications.     Discussion of Management   None    ED Course   ED Course as of 02/06/25 1043   Thu Feb 06, 2025   1001 I obtained the history and examined the patient as noted above.      1029 I rechecked and updated the patient regarding follow up x-ray results.         Additional Documentation  None    Medical Decision Making / Diagnosis     CMS Diagnoses:  None    [facility]       None    [facility]   Afsaneh Lloyd is a 44 year old female who presents emergency department with dislocation of the PIP joint of the left little finger.  Details as above.  X-ray does show the dislocation without evidence of fracture.  I was able to easily reduce this at the bedside without anesthesia.  She was immediately feeling better.  She had full range of motion.  Postreduction imaging shows the joint to be in good position again without fracture.  She will be placed in an AlumaFoam splint.  Continue with ice and ibuprofen.  Follow-up as needed.    Disposition   The patient was discharged.     Diagnosis     ICD-10-CM    1. Dislocation of finger, initial encounter  S63.259A            Discharge Medications   New Prescriptions    No medications on file         Scribe Disclosure:  Cha RUTHERFORD, am serving as a scribe at 10:33 AM on 2/6/2025 to document services personally performed by Carlos Napoles MD based on my observations and the provider's statements to me.        Carlos Napoles MD  02/06/25 1041

## 2025-02-06 NOTE — ED TRIAGE NOTES
Pt reports falling on escalator at [employer]. Pt has injury to left hand 5th digit.     Triage Assessment (Adult)       Row Name 02/06/25 0934          Triage Assessment    Airway WDL WDL        Respiratory WDL    Respiratory WDL WDL        Cognitive/Neuro/Behavioral WDL    Cognitive/Neuro/Behavioral WDL mood/behavior     Mood/Behavior anxious

## 2025-08-28 ENCOUNTER — LAB REQUISITION (OUTPATIENT)
Dept: LAB | Facility: CLINIC | Age: 45
End: 2025-08-28
Payer: COMMERCIAL

## 2025-08-28 DIAGNOSIS — Z13.1 ENCOUNTER FOR SCREENING FOR DIABETES MELLITUS: ICD-10-CM

## 2025-08-28 DIAGNOSIS — R53.83 OTHER FATIGUE: ICD-10-CM

## 2025-08-28 LAB
ERYTHROCYTE [DISTWIDTH] IN BLOOD BY AUTOMATED COUNT: 11.4 % (ref 10–15)
EST. AVERAGE GLUCOSE BLD GHB EST-MCNC: 85 MG/DL
HBA1C MFR BLD: 4.6 %
HCT VFR BLD AUTO: 42.6 % (ref 35–47)
HGB BLD-MCNC: 14.4 G/DL (ref 11.7–15.7)
MCH RBC QN AUTO: 34.6 PG (ref 26.5–33)
MCHC RBC AUTO-ENTMCNC: 33.8 G/DL (ref 31.5–36.5)
MCV RBC AUTO: 102.4 FL (ref 78–100)
PLATELET # BLD AUTO: 239 10E3/UL (ref 150–450)
RBC # BLD AUTO: 4.16 10E6/UL (ref 3.8–5.2)
WBC # BLD AUTO: 4.74 10E3/UL (ref 4–11)